# Patient Record
Sex: FEMALE | Race: WHITE | NOT HISPANIC OR LATINO | Employment: UNEMPLOYED | URBAN - METROPOLITAN AREA
[De-identification: names, ages, dates, MRNs, and addresses within clinical notes are randomized per-mention and may not be internally consistent; named-entity substitution may affect disease eponyms.]

---

## 2019-04-29 ENCOUNTER — TELEPHONE (OUTPATIENT)
Dept: CARDIOLOGY | Facility: CLINIC | Age: 69
End: 2019-04-29

## 2019-04-29 DIAGNOSIS — R53.83 FATIGUE, UNSPECIFIED TYPE: Primary | ICD-10-CM

## 2019-04-29 NOTE — TELEPHONE ENCOUNTER
Received a call from Dr. Dao Hi requesting that the pt be scheduled for an appt for possible HF. Says that the pt has had an outside ekg and an echo. Spoke with Dr. Gates. Spoke with the pt and scheduled her for an appt on 5-2-19 for an ekg and an appt with Dr. Gates. Pt says she will bring ekg, echo report, and any other pertinent info with her to the appt.

## 2019-05-02 ENCOUNTER — OFFICE VISIT (OUTPATIENT)
Dept: CARDIOLOGY | Facility: CLINIC | Age: 69
End: 2019-05-02
Payer: MEDICARE

## 2019-05-02 ENCOUNTER — HOSPITAL ENCOUNTER (OUTPATIENT)
Dept: CARDIOLOGY | Facility: CLINIC | Age: 69
Discharge: HOME | End: 2019-05-02
Payer: MEDICARE

## 2019-05-02 VITALS
WEIGHT: 203.94 LBS | SYSTOLIC BLOOD PRESSURE: 100 MMHG | BODY MASS INDEX: 33.98 KG/M2 | HEIGHT: 65 IN | OXYGEN SATURATION: 96 % | HEART RATE: 91 BPM | DIASTOLIC BLOOD PRESSURE: 60 MMHG

## 2019-05-02 DIAGNOSIS — N18.4 CKD (CHRONIC KIDNEY DISEASE) STAGE 4, GFR 15-29 ML/MIN: ICD-10-CM

## 2019-05-02 DIAGNOSIS — R53.83 FATIGUE, UNSPECIFIED TYPE: ICD-10-CM

## 2019-05-02 DIAGNOSIS — I42.9 CARDIOMYOPATHY, UNSPECIFIED TYPE: Primary | ICD-10-CM

## 2019-05-02 PROCEDURE — 93010 EKG 12-LEAD: ICD-10-PCS | Mod: S$PBB,,, | Performed by: INTERNAL MEDICINE

## 2019-05-02 PROCEDURE — 93005 ELECTROCARDIOGRAM TRACING: CPT | Mod: PBBFAC | Performed by: INTERNAL MEDICINE

## 2019-05-02 PROCEDURE — 99204 OFFICE O/P NEW MOD 45 MIN: CPT | Mod: S$PBB,25,, | Performed by: INTERNAL MEDICINE

## 2019-05-02 PROCEDURE — 99204 PR OFFICE/OUTPT VISIT, NEW, LEVL IV, 45-59 MIN: ICD-10-PCS | Mod: S$PBB,25,, | Performed by: INTERNAL MEDICINE

## 2019-05-02 PROCEDURE — 99999 PR PBB SHADOW E&M-EST. PATIENT-LVL IV: CPT | Mod: PBBFAC,,, | Performed by: INTERNAL MEDICINE

## 2019-05-02 PROCEDURE — 99214 OFFICE O/P EST MOD 30 MIN: CPT | Mod: PBBFAC,25 | Performed by: INTERNAL MEDICINE

## 2019-05-02 PROCEDURE — 99999 PR PBB SHADOW E&M-EST. PATIENT-LVL IV: ICD-10-PCS | Mod: PBBFAC,,, | Performed by: INTERNAL MEDICINE

## 2019-05-02 PROCEDURE — 93010 ELECTROCARDIOGRAM REPORT: CPT | Mod: S$PBB,,, | Performed by: INTERNAL MEDICINE

## 2019-05-02 RX ORDER — CARVEDILOL 6.25 MG/1
6.25 TABLET ORAL 2 TIMES DAILY WITH MEALS
COMMUNITY

## 2019-05-02 RX ORDER — SPIRONOLACTONE 25 MG/1
25 TABLET ORAL DAILY
Qty: 90 TABLET | Refills: 3 | Status: SHIPPED | OUTPATIENT
Start: 2019-05-02 | End: 2020-05-01

## 2019-05-02 RX ORDER — LOSARTAN POTASSIUM 100 MG/1
100 TABLET ORAL
COMMUNITY

## 2019-05-02 NOTE — PROGRESS NOTES
"Subjective:    Patient ID:  Sadie Dixon is a 69 y.o. female who presents for evaluation of Congestive Heart Failure      HPI   The patient is a 69 year old female presents for a second opinion on  management of her non ischemic cardiomyopathy.  She presented in 2014 with SOB and SIDDIQUI for "months" and was found to have an EF of 14%. PET was negative for ischemia. She has no history of hypertension diabetes etc. Since las seen she has moderate SIDDIQUI but no edema, orthopnea PND  and no hospitalizations for CHF exacerbations. Review of recent echo and lab revealed a EF of 35%. BNP13 but Cr 1.89. She is under care of nephrologist who thought her CRD was due to her heart disease. There had been a few yeargap in obtaining blood testing. Her Creatine is 2014 was 1.4 .  Will resume  Aldactone 25 mg daily and advised close follow up of potassium.        v  CONCLUSIONS     1 - Left ventricular enlargement.     2 - Eccentric hypertrophy.     3 - Severely depressed left ventricular systolic function (EF 15-20%).     4 - Quantitatively measured LV function is 14%.     5 - Left ventricular diastolic dysfunction.     6 - Mild to moderate mitral regurgitation.     7 - Biatrial enlargement.     8 - Trivial pulmonic regurgitation.     9 - Moderately depressed right ventricular systolic function .     10 - Trivial tricuspid regurgitation.     11 - Intermediate central venous pressure.         This document has been electronically    SIGNED BY: Dejuan Lopez MD On: 07/17/2014 16:37      CONCLUSIONS: NO CLINICALLY SIGNIFICANT STRESS INDUCED PERFUSION DEFECTS as assessed with PET perfusion imaging.  1. There is no evidence of a discrete hemodynamically significant coronary stenosis.   2. Although no clinically significant stress defect is seen, there is resting flow heterogeneity that improves after Dipyridamole. These results suggest mild endothelial dysfunction due to mild, diffuse, non-obstructive coronary atherosclerosis without "   clinically significant, localized perfusion defects.   3. There is abnormal wall motion at rest showing severe global hypokinesis of the left ventricle. There is abnormal wall motion at stress showing severe global hypokinesis of the left ventricle.   4. There is resting LV dysfunction with a reduced ejection fraction of 17 %. There is stress induced LV dysfunction with a reduced ejection fraction of 18 %.  (normal is >= 51%)  5. The left ventricular volume is moderately increased at rest.   6. The extracardiac distribution of radioactivity is normal.   7. There was no previous study available to compare.      This document has been electronically    SIGNED BY: Solomon Grant MD On: 07/18/2014 16:43  Lab Results   Component Value Date     07/18/2014    K 3.3 (L) 07/18/2014     07/18/2014    CO2 32 (H) 07/18/2014    BUN 20 07/18/2014    CREATININE 1.3 07/18/2014    GLU 98 07/18/2014    HGBA1C 5.8 07/17/2014    MG 2.1 07/18/2014    AST 26 07/16/2014    ALT 30 07/16/2014    ALBUMIN 3.5 07/16/2014    PROT 6.4 07/16/2014    BILITOT 1.5 (H) 07/16/2014    WBC 4.78 07/16/2014    HGB 13.4 07/16/2014    HCT 40.3 07/16/2014    MCV 88 07/16/2014     07/16/2014    INR 1.1 07/16/2014    TSH 3.333 07/15/2014         Lab Results   Component Value Date    CHOL 191 07/15/2014    HDL 44 07/15/2014    TRIG 120 07/15/2014       Lab Results   Component Value Date    LDLCALC 123.0 07/15/2014       Past Medical History:   Diagnosis Date    Anxiety     Depression     GERD (gastroesophageal reflux disease)        Current Outpatient Medications:     carvedilol (COREG) 6.25 MG tablet, Take 6.25 mg by mouth 2 (two) times daily with meals., Disp: , Rfl:     escitalopram oxalate (LEXAPRO) 20 MG tablet, Take 20 mg by mouth once daily. Take 1/2 tab in the morning. Take 1/2 tab in the evening., Disp: , Rfl:     losartan (COZAAR) 100 MG tablet, Take 100 mg by mouth., Disp: , Rfl:     ranitidine (ZANTAC) 150 MG tablet, Take  150 mg by mouth nightly., Disp: , Rfl:     diazepam (VALIUM) 10 MG Tab, Take 1 tablet (10 mg total) by mouth once., Disp: 1 tablet, Rfl: 0    esomeprazole (NEXIUM) 40 MG capsule, Take 40 mg by mouth before breakfast., Disp: , Rfl:     furosemide (LASIX) 40 MG tablet, Take 1 tablet (40 mg total) by mouth once daily., Disp: 30 tablet, Rfl: 3    lisinopril (PRINIVIL,ZESTRIL) 5 MG tablet, Take 1 tablet (5 mg total) by mouth once daily., Disp: 30 tablet, Rfl: 3    spironolactone (ALDACTONE) 25 MG tablet, Take 1 tablet (25 mg total) by mouth once daily., Disp: 90 tablet, Rfl: 3          Review of Systems   Constitution: Negative for decreased appetite, diaphoresis, fever, malaise/fatigue, weight gain and weight loss.   HENT: Negative for congestion, ear discharge, ear pain and nosebleeds.    Eyes: Negative for blurred vision, double vision and visual disturbance.   Cardiovascular: Positive for dyspnea on exertion (mild). Negative for chest pain, claudication, cyanosis, irregular heartbeat, leg swelling, near-syncope, orthopnea, palpitations, paroxysmal nocturnal dyspnea and syncope.   Respiratory: Negative for cough, hemoptysis, shortness of breath, sleep disturbances due to breathing, snoring, sputum production and wheezing.    Endocrine: Negative for polydipsia, polyphagia and polyuria.   Hematologic/Lymphatic: Negative for adenopathy and bleeding problem. Does not bruise/bleed easily.   Skin: Negative for color change, nail changes, poor wound healing and rash.   Musculoskeletal: Negative for muscle cramps and muscle weakness.   Gastrointestinal: Negative for abdominal pain, anorexia, change in bowel habit, hematochezia, nausea and vomiting.   Genitourinary: Negative for dysuria, frequency and hematuria.   Neurological: Negative for brief paralysis, difficulty with concentration, excessive daytime sleepiness, dizziness, focal weakness, headaches, light-headedness, seizures, vertigo and weakness.  "  Psychiatric/Behavioral: Negative for altered mental status and depression.   Allergic/Immunologic: Negative for persistent infections.        Objective:/60   Pulse 91   Ht 5' 5" (1.651 m)   Wt 92.5 kg (203 lb 14.8 oz)   SpO2 96%   BMI 33.93 kg/m²             Physical Exam   Constitutional: She is oriented to person, place, and time. She appears well-developed and well-nourished.   obese   HENT:   Head: Normocephalic.   Right Ear: External ear normal.   Left Ear: External ear normal.   Nose: Nose normal.   Inspection of lips, teeth and gums normal   Eyes: Pupils are equal, round, and reactive to light. Conjunctivae and EOM are normal. No scleral icterus.   Neck: Normal range of motion. No JVD present. No tracheal deviation present. No thyromegaly present.   Cardiovascular: Normal rate, regular rhythm, normal heart sounds and intact distal pulses. Exam reveals no gallop and no friction rub.   No murmur heard.  Pulses:       Dorsalis pedis pulses are 2+ on the right side, and 2+ on the left side.        Posterior tibial pulses are 2+ on the right side, and 2+ on the left side.   Pulmonary/Chest: Effort normal and breath sounds normal. No respiratory distress. She has no wheezes. She has no rales. She exhibits no tenderness.   Abdominal: Soft. Bowel sounds are normal. She exhibits no distension. There is no hepatosplenomegaly. There is no tenderness. There is no guarding.   Musculoskeletal: Normal range of motion. She exhibits no edema or tenderness.   Lymphadenopathy:   Palpation of lymph nodes of neck and groin normal   Neurological: She is oriented to person, place, and time. No cranial nerve deficit. She exhibits normal muscle tone. Coordination normal.   Skin: Skin is warm and dry. No rash noted. No erythema. No pallor.   Palpation of skin normal   Psychiatric: She has a normal mood and affect. Her behavior is normal. Judgment and thought content normal.         Assessment:       1. Cardiomyopathy, " unspecified type NYHA class II   2. CKD (chronic kidney disease) stage 4, GFR 15-29 ml/min         Plan:       Sadie was seen today for congestive heart failure.    Diagnoses and all orders for this visit:    Cardiomyopathy, unspecified type  -     spironolactone (ALDACTONE) 25 MG tablet; Take 1 tablet (25 mg total) by mouth once daily.    CKD (chronic kidney disease) stage 4, GFR 15-29 ml/min    Other orders  -     losartan (COZAAR) 100 MG tablet; Take 100 mg by mouth.  -     carvedilol (COREG) 6.25 MG tablet; Take 6.25 mg by mouth 2 (two) times daily with meals.